# Patient Record
Sex: MALE | Race: BLACK OR AFRICAN AMERICAN
[De-identification: names, ages, dates, MRNs, and addresses within clinical notes are randomized per-mention and may not be internally consistent; named-entity substitution may affect disease eponyms.]

---

## 2018-01-21 ENCOUNTER — HOSPITAL ENCOUNTER (EMERGENCY)
Dept: HOSPITAL 96 - M.ERS | Age: 48
Discharge: HOME | End: 2018-01-21
Payer: MEDICAID

## 2018-01-21 VITALS — HEIGHT: 70 IN | WEIGHT: 175 LBS | BODY MASS INDEX: 25.05 KG/M2

## 2018-01-21 VITALS — DIASTOLIC BLOOD PRESSURE: 69 MMHG | SYSTOLIC BLOOD PRESSURE: 121 MMHG

## 2018-01-21 DIAGNOSIS — I11.0: ICD-10-CM

## 2018-01-21 DIAGNOSIS — Z88.1: ICD-10-CM

## 2018-01-21 DIAGNOSIS — Z76.0: Primary | ICD-10-CM

## 2018-02-15 ENCOUNTER — HOSPITAL ENCOUNTER (EMERGENCY)
Dept: HOSPITAL 96 - M.ERS | Age: 48
Discharge: LEFT BEFORE DISCHARGE | End: 2018-02-15
Payer: COMMERCIAL

## 2018-02-15 VITALS — HEIGHT: 70 IN | WEIGHT: 175 LBS | BODY MASS INDEX: 25.05 KG/M2

## 2018-02-15 VITALS — DIASTOLIC BLOOD PRESSURE: 83 MMHG | SYSTOLIC BLOOD PRESSURE: 118 MMHG

## 2018-02-15 DIAGNOSIS — Z53.21: Primary | ICD-10-CM

## 2018-04-10 ENCOUNTER — HOSPITAL ENCOUNTER (EMERGENCY)
Dept: HOSPITAL 96 - M.ERS | Age: 48
Discharge: HOME | End: 2018-04-10
Payer: COMMERCIAL

## 2018-04-10 VITALS — SYSTOLIC BLOOD PRESSURE: 116 MMHG | DIASTOLIC BLOOD PRESSURE: 87 MMHG

## 2018-04-10 VITALS — WEIGHT: 185.01 LBS | BODY MASS INDEX: 26.49 KG/M2 | HEIGHT: 70 IN

## 2018-04-10 DIAGNOSIS — Z88.1: ICD-10-CM

## 2018-04-10 DIAGNOSIS — I50.9: ICD-10-CM

## 2018-04-10 DIAGNOSIS — R07.81: Primary | ICD-10-CM

## 2018-04-10 DIAGNOSIS — Z76.0: ICD-10-CM

## 2018-04-10 DIAGNOSIS — F17.210: ICD-10-CM

## 2018-04-10 DIAGNOSIS — I11.0: ICD-10-CM

## 2018-04-10 LAB
ABSOLUTE BASOPHILS: 0.1 THOU/UL (ref 0–0.2)
ABSOLUTE EOSINOPHILS: 0.2 THOU/UL (ref 0–0.7)
ABSOLUTE MONOCYTES: 0.9 THOU/UL (ref 0–1.2)
ALBUMIN SERPL-MCNC: 3.2 G/DL (ref 3.4–5)
ALP SERPL-CCNC: 73 U/L (ref 46–116)
ALT SERPL-CCNC: 119 U/L (ref 30–65)
ANION GAP SERPL CALC-SCNC: 7 MMOL/L (ref 7–16)
AST SERPL-CCNC: 53 U/L (ref 15–37)
BASOPHILS NFR BLD AUTO: 0.9 %
BILIRUB SERPL-MCNC: 0.9 MG/DL
BUN SERPL-MCNC: 18 MG/DL (ref 7–18)
CALCIUM SERPL-MCNC: 8.5 MG/DL (ref 8.5–10.1)
CHLORIDE SERPL-SCNC: 101 MMOL/L (ref 98–107)
CO2 SERPL-SCNC: 31 MMOL/L (ref 21–32)
CREAT SERPL-MCNC: 2 MG/DL (ref 0.6–1.3)
EOSINOPHIL NFR BLD: 1.8 %
GLUCOSE SERPL-MCNC: 104 MG/DL (ref 70–99)
GRANULOCYTES NFR BLD MANUAL: 68.6 %
HCT VFR BLD CALC: 36.4 % (ref 42–52)
HGB BLD-MCNC: 11.4 GM/DL (ref 14–18)
LYMPHOCYTES # BLD: 1.8 THOU/UL (ref 0.8–5.3)
LYMPHOCYTES NFR BLD AUTO: 19.3 %
MCH RBC QN AUTO: 27.1 PG (ref 26–34)
MCHC RBC AUTO-ENTMCNC: 31.3 G/DL (ref 28–37)
MCV RBC: 86.7 FL (ref 80–100)
MONOCYTES NFR BLD: 9.4 %
MPV: 9.5 FL. (ref 7.2–11.1)
NEUTROPHILS # BLD: 6.3 THOU/UL (ref 1.6–8.1)
NT-PRO BRAIN NAT PEPTIDE: 4519 PG/ML (ref ?–300)
NUCLEATED RBCS: 0 /100WBC
PLATELET COUNT*: 208 THOU/UL (ref 150–400)
POTASSIUM SERPL-SCNC: 3.3 MMOL/L (ref 3.5–5.1)
PROT SERPL-MCNC: 7.4 G/DL (ref 6.4–8.2)
RBC # BLD AUTO: 4.19 MIL/UL (ref 4.5–6)
RDW-CV: 16.8 % (ref 10.5–14.5)
SODIUM SERPL-SCNC: 139 MMOL/L (ref 136–145)
WBC # BLD AUTO: 9.2 THOU/UL (ref 4–11)

## 2018-04-11 ENCOUNTER — HOSPITAL ENCOUNTER (EMERGENCY)
Dept: HOSPITAL 96 - M.ERS | Age: 48
Discharge: HOME | End: 2018-04-11
Payer: COMMERCIAL

## 2018-04-11 VITALS — DIASTOLIC BLOOD PRESSURE: 67 MMHG | SYSTOLIC BLOOD PRESSURE: 100 MMHG

## 2018-04-11 VITALS — WEIGHT: 207.68 LBS | HEIGHT: 70 IN | BODY MASS INDEX: 29.73 KG/M2

## 2018-04-11 DIAGNOSIS — I50.9: ICD-10-CM

## 2018-04-11 DIAGNOSIS — Z88.1: ICD-10-CM

## 2018-04-11 DIAGNOSIS — T50.995A: Primary | ICD-10-CM
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
## 2018-04-11 DIAGNOSIS — F17.210: ICD-10-CM

## 2018-04-11 DIAGNOSIS — I11.0: ICD-10-CM

## 2018-04-24 ENCOUNTER — HOSPITAL ENCOUNTER (INPATIENT)
Dept: HOSPITAL 96 - M.ERS | Age: 48
LOS: 1 days | Discharge: LEFT BEFORE DISCHARGE | DRG: 291 | End: 2018-04-25
Attending: INTERNAL MEDICINE | Admitting: INTERNAL MEDICINE
Payer: COMMERCIAL

## 2018-04-24 VITALS — BODY MASS INDEX: 30.31 KG/M2 | HEIGHT: 70 IN | WEIGHT: 211.7 LBS

## 2018-04-24 VITALS — DIASTOLIC BLOOD PRESSURE: 81 MMHG | SYSTOLIC BLOOD PRESSURE: 115 MMHG

## 2018-04-24 VITALS — SYSTOLIC BLOOD PRESSURE: 126 MMHG | DIASTOLIC BLOOD PRESSURE: 90 MMHG

## 2018-04-24 DIAGNOSIS — Z79.899: ICD-10-CM

## 2018-04-24 DIAGNOSIS — Z91.14: ICD-10-CM

## 2018-04-24 DIAGNOSIS — I13.0: Primary | ICD-10-CM

## 2018-04-24 DIAGNOSIS — I42.9: ICD-10-CM

## 2018-04-24 DIAGNOSIS — N39.0: ICD-10-CM

## 2018-04-24 DIAGNOSIS — Z95.810: ICD-10-CM

## 2018-04-24 DIAGNOSIS — Z88.1: ICD-10-CM

## 2018-04-24 DIAGNOSIS — Z88.8: ICD-10-CM

## 2018-04-24 DIAGNOSIS — I50.23: ICD-10-CM

## 2018-04-24 DIAGNOSIS — R33.9: ICD-10-CM

## 2018-04-24 DIAGNOSIS — F17.210: ICD-10-CM

## 2018-04-24 LAB
ABSOLUTE BASOPHILS: 0.1 THOU/UL (ref 0–0.2)
ABSOLUTE EOSINOPHILS: 0.1 THOU/UL (ref 0–0.7)
ABSOLUTE MONOCYTES: 0.8 THOU/UL (ref 0–1.2)
ALBUMIN SERPL-MCNC: 2.8 G/DL (ref 3.4–5)
ALP SERPL-CCNC: 82 U/L (ref 46–116)
ALT SERPL-CCNC: 36 U/L (ref 30–65)
ANION GAP SERPL CALC-SCNC: 6 MMOL/L (ref 7–16)
APTT BLD: 26.3 SECONDS (ref 25–31.3)
AST SERPL-CCNC: 22 U/L (ref 15–37)
BACTERIA-REFLEX: (no result) /HPF
BASOPHILS NFR BLD AUTO: 1.4 %
BILIRUB SERPL-MCNC: 1.1 MG/DL
BILIRUB UR-MCNC: NEGATIVE MG/DL
BUN SERPL-MCNC: 14 MG/DL (ref 7–18)
CALCIUM SERPL-MCNC: 8.2 MG/DL (ref 8.5–10.1)
CHLORIDE SERPL-SCNC: 103 MMOL/L (ref 98–107)
CO2 SERPL-SCNC: 32 MMOL/L (ref 21–32)
COLOR UR: YELLOW
CREAT SERPL-MCNC: 1.6 MG/DL (ref 0.6–1.3)
EOSINOPHIL NFR BLD: 1.7 %
GLUCOSE SERPL-MCNC: 135 MG/DL (ref 70–99)
GRANULOCYTES NFR BLD MANUAL: 66.9 %
HCT VFR BLD CALC: 33.7 % (ref 42–52)
HGB BLD-MCNC: 10.5 GM/DL (ref 14–18)
HYALINE CASTS #/AREA URNS LPF: (no result) /LPF
INR PPP: 1.5
KETONES UR STRIP-MCNC: NEGATIVE MG/DL
LYMPHOCYTES # BLD: 1.5 THOU/UL (ref 0.8–5.3)
LYMPHOCYTES NFR BLD AUTO: 19.9 %
MCH RBC QN AUTO: 26 PG (ref 26–34)
MCHC RBC AUTO-ENTMCNC: 31.1 G/DL (ref 28–37)
MCV RBC: 83.7 FL (ref 80–100)
MONOCYTES NFR BLD: 10.1 %
MPV: 9.6 FL. (ref 7.2–11.1)
NEUTROPHILS # BLD: 5.1 THOU/UL (ref 1.6–8.1)
NT-PRO BRAIN NAT PEPTIDE: 2990 PG/ML (ref ?–300)
NUCLEATED RBCS: 0 /100WBC
PLATELET COUNT*: 177 THOU/UL (ref 150–400)
POTASSIUM SERPL-SCNC: 3.5 MMOL/L (ref 3.5–5.1)
PROT SERPL-MCNC: 7.2 G/DL (ref 6.4–8.2)
PROT UR QL STRIP: (no result)
PROTHROMBIN TIME: 14.3 SECONDS (ref 9.2–11.5)
RBC # BLD AUTO: 4.03 MIL/UL (ref 4.5–6)
RBC # UR STRIP: (no result) /UL
RBC #/AREA URNS HPF: (no result) /HPF (ref 0–2)
RDW-CV: 18.1 % (ref 10.5–14.5)
SODIUM SERPL-SCNC: 141 MMOL/L (ref 136–145)
SP GR UR STRIP: 1.01 (ref 1–1.03)
SQUAMOUS: (no result) /LPF (ref 0–3)
TROPONIN-I LEVEL: <0.06 NG/ML (ref ?–0.06)
URINE CLARITY: CLEAR
URINE GLUCOSE-RANDOM: NEGATIVE
URINE LEUKOCYTES-REFLEX: (no result)
URINE NITRITE-REFLEX: NEGATIVE
URINE WBC-REFLEX: (no result) /HPF (ref 0–5)
UROBILINOGEN UR STRIP-ACNC: 2 E.U./DL (ref 0.2–1)
WBC # BLD AUTO: 7.6 THOU/UL (ref 4–11)

## 2018-04-25 VITALS — SYSTOLIC BLOOD PRESSURE: 118 MMHG | DIASTOLIC BLOOD PRESSURE: 88 MMHG

## 2018-04-25 VITALS — SYSTOLIC BLOOD PRESSURE: 93 MMHG | DIASTOLIC BLOOD PRESSURE: 69 MMHG

## 2018-04-25 VITALS — SYSTOLIC BLOOD PRESSURE: 117 MMHG | DIASTOLIC BLOOD PRESSURE: 87 MMHG

## 2018-04-25 VITALS — SYSTOLIC BLOOD PRESSURE: 109 MMHG | DIASTOLIC BLOOD PRESSURE: 83 MMHG

## 2018-04-25 LAB
ANION GAP SERPL CALC-SCNC: 7 MMOL/L (ref 7–16)
BUN SERPL-MCNC: 16 MG/DL (ref 7–18)
CALCIUM SERPL-MCNC: 8.5 MG/DL (ref 8.5–10.1)
CHLORIDE SERPL-SCNC: 101 MMOL/L (ref 98–107)
CO2 SERPL-SCNC: 30 MMOL/L (ref 21–32)
CREAT SERPL-MCNC: 1.4 MG/DL (ref 0.6–1.3)
GLUCOSE SERPL-MCNC: 150 MG/DL (ref 70–99)
MAGNESIUM SERPL-MCNC: 1.8 MG/DL (ref 1.8–2.4)
POTASSIUM SERPL-SCNC: 4.1 MMOL/L (ref 3.5–5.1)
SODIUM SERPL-SCNC: 138 MMOL/L (ref 136–145)

## 2018-04-25 NOTE — 2DMMODE
[place] [address]
Phone:  [phone] 2 D/M-MODE ECHOCARDIOGRAM     
_______________________________________________________________________________
 
Name:         ISIDRA MCDONNELL              Room:          [address] [place] 
[facility]#:    [id number]     Account #:     [id number]  
Admission:    18    Attend Phys:   Omari Wesley, 
Discharge:                Date of Birth: [birth year]  
Date of Service: 18 1520  Report #:      [id number]
        [id number]
_______________________________________________________________________________
THIS REPORT FOR:  //name//                      
 
 
--------------- APPROVED REPORT --------------
 
 
Study performed:  2018 14:01:01
 
EXAM: Comprehensive 2D, Doppler, and color-flow 
Echocardiogram 
Patient Location: In-Patient   
Room #:  [facility]     Status:  routine
 
      BSA:         2.14
HR: 96 bpm BP:          117/87 mmHg 
Rhythm: NSR     
 
Other Information 
Study Quality: Good
 
Indications
Congestive Heart Failure
Dyspnea 
 
2D Dimensions
   LVEF(%):  8.76 (&gt;50%)
IVSd:  9.79 (7-11mm) LVOT Diam:  19.59 (18-24mm) 
LVDd:  60.78 mm  
PWd:  9.93 (7-11mm) Ascending Ao:  29.19 (22-36mm)
LVDs:  58.39 (25-40mm) 
Aortic Root:  31.54 mm 
   Francois's LVEF:  8.76 %
 
Volumes
Left Atrial Volume (Systole) 
    LA ESV Index:  65.70 mL/m2
 
Aortic Valve
AoV Peak Vic.:  0.60 m/s 
AO Peak Gr.:  1.46 mmHg  LVOT Max P.42 mmHg
AO Mean Gr.:  0.96 mmHg  LVOT Mean P.31 mmHg
    LVOT Max V:  0.78 m/s
AO V2 VTI:  6.32 cm  LVOT Mean V:  0.54 m/s
TAE (VTI):  4.77 cm2  LVOT V1 VTI:  10.00 cm
 
Mitral Valve
 
 
[place] [address]
Phone:  [phone]                     2 D/M-MODE ECHOCARDIOGRAM     
_______________________________________________________________________________
 
Name:         ISIDRA MCDONNELL              Room:          [address] [place] 
.R.#:    [id number]     Account #:     [id number]  
Admission:    18    Attend Phys:   Omari Wesley, 
Discharge:                Date of Birth: [birth year]  
Date of Service: 18 1520  Report #:      [id number]
        [id number]
_______________________________________________________________________________
    E/A Ratio:  2.57
    MV Decel. Time:  88.13 ms
MV E Max Vic.:  0.79 m/s 
MV PHT:  25.56 ms  
MVA (PHT):  8.61 cm2  
 
TDI
E/Lateral E':  7.18 
   Lateral E' Vic.:  0.11 m/s
 
Pulmonary Valve
PV Peak Vic.:  0.56 m/s PV Peak Gr.:  1.26 mmHg
 
Tricuspid Valve
TR Peak Gr.:  17.38 mmHg RVSP:  32.00 mmHg
 
Left Ventricle
Left ventricle is moderately dilated. Severe global hypokinesis. 
There is normal left ventricular wall thickness. Left ventricular 
systolic function is severely decreased. LVEF is 20%. Grade IV - 
fixed restrictive diastolic dysfunction.
 
Right Ventricle
Right ventricle is mildly dilated. The right ventricular systolic 
function is normal. Pacemaker lead is present in the right ventricle. 
 
Atria
Left atrium is moderately dilated. Right atrium is dilated.
 
Aortic Valve
Mild aortic valve sclerosis. Trace aortic regurgitation. There is no 
aortic valvular stenosis.
 
Mitral Valve
The mitral valve is normal in structure. Mild mitral regurgitation. 
No evidence of mitral valve stenosis.
 
Tricuspid Valve
The tricuspid valve is normal in structure. Moderate tricuspid 
regurgitation. The RVSP is 30-35 mmHg.
 
Pulmonic Valve
The pulmonary valve is normal in structure. Mild pulmonic 
regurgitation.
 
[employer]
 
 
[place] [address]
Phone:  [phone] 2 D/M-MODE ECHOCARDIOGRAM     
_______________________________________________________________________________
 
Name:         ISIDRA MCDONNELL              Room:          [address]#:    [id number]     Account #:     [id number]  
Admission:    18    Attend Phys:   Omari Wesley, 
Discharge:                Date of Birth: [birth year]  
Date of Service: 18 1520  Report #:      [id number]
        [id number]
_______________________________________________________________________________
The aortic root is normal in size. IVC is dilated and collapses 
&lt;50% with inspiration.
 
Pericardium
There is no pericardial effusion.
 
&lt;Conclusion&gt;
Left ventricle is moderately dilated.
There is normal left ventricular wall thickness.
Left ventricular systolic function is severely decreased.
LVEF is 20%.
Grade IV - fixed restrictive diastolic dysfunction.
Right ventricle is mildly dilated.
Left atrium is moderately dilated.
Right atrium is dilated.
Mild aortic valve sclerosis.
Trace aortic regurgitation.
There is no aortic valvular stenosis.
The mitral valve is normal in structure.
Mild mitral regurgitation.
The tricuspid valve is normal in structure.
Moderate tricuspid regurgitation.
The RVSP is 30-35 mmHg.
IVC is dilated and collapses &lt;50% with inspiration.
There is no pericardial effusion.
Severe global hypokinesis.
Pacemaker lead is present in the right ventricle.
 
 
 
 
 
 
 
 
 
 
 
 
 
 
 
 
 
  <ELECTRONICALLY SIGNED>
                                           By: Mario Bermudez MD, FACC     
  18     1520
D: 18 1520   _____________________________________
T: 18 1520   Mario Bermudez MD, FACC       /INF

## 2018-04-25 NOTE — EKG
[place] [address]
Phone:  [phone]                     ELECTROCARDIOGRAM REPORT      
_______________________________________________________________________________
 
Name:       ISIDRA MCDONNELL               Room:           [address]    ADM [place]  
M.R.#:  [id number]      Account #:      [id number]  
Admission:  18     Attend Phys:    Omari Wesley MD 
Discharge:               Date of Birth:  [birth year]  
         Report #: [id number]
    [id number]
_______________________________________________________________________________
THIS REPORT FOR:  //name//                      
 
                         [facility] ED
                                       
Test Date:    2018               Test Time:    22:00:50
Pat Name:     ISIDRA MCDONNELL           Department:   
Patient ID:   [id number]            Room:         [facility]
Gender:       M                        Technician:   BD
:          1970               Requested By: Jennifer Falk
Order Number: [id number] MD:   Mario Bermudez
                                 Measurements
Intervals                              Axis          
Rate:         97                       P:            65
TN:           158                      QRS:          1
QRSD:         89                       T:            
QT:           385                                    
QTc:          489                                    
                           Interpretive Statements
Sinus rhythm
Probable left atrial enlargement
Nonspecific T abnormalities, lateral leads
Borderline prolonged QT interval
Baseline wander in lead(s) V5
Compared to ECG 2017 01:02:54
T-wave abnormality now present
 
Electronically Signed On 2018 15:08:00 CDT by Mario Bermudez
[email or web address]
 
 
 
 
 
 
 
 
 
 
 
 
 
 
 
  <ELECTRONICALLY SIGNED>
                                           By: Mario Bermudez MD, [facility]     
  18     1508
D: 18   _____________________________________
T: 18   Mario Bermudez MD, [facility]       /EPI

## 2018-04-28 ENCOUNTER — HOSPITAL ENCOUNTER (EMERGENCY)
Dept: HOSPITAL 96 - M.ERS | Age: 48
Discharge: HOME | End: 2018-04-28
Payer: COMMERCIAL

## 2018-04-28 VITALS — BODY MASS INDEX: 30.49 KG/M2 | HEIGHT: 70 IN | WEIGHT: 213.01 LBS

## 2018-04-28 VITALS — DIASTOLIC BLOOD PRESSURE: 79 MMHG | SYSTOLIC BLOOD PRESSURE: 112 MMHG

## 2018-04-28 DIAGNOSIS — Z88.1: ICD-10-CM

## 2018-04-28 DIAGNOSIS — Z88.8: ICD-10-CM

## 2018-04-28 DIAGNOSIS — I50.9: ICD-10-CM

## 2018-04-28 DIAGNOSIS — I13.0: Primary | ICD-10-CM

## 2018-04-28 DIAGNOSIS — N18.3: ICD-10-CM

## 2018-04-28 DIAGNOSIS — F17.210: ICD-10-CM

## 2018-04-28 LAB
ABSOLUTE BASOPHILS: 0.1 THOU/UL (ref 0–0.2)
ABSOLUTE EOSINOPHILS: 0.1 THOU/UL (ref 0–0.7)
ABSOLUTE MONOCYTES: 0.6 THOU/UL (ref 0–1.2)
ALBUMIN SERPL-MCNC: 2.8 G/DL (ref 3.4–5)
ALP SERPL-CCNC: 90 U/L (ref 46–116)
ALT SERPL-CCNC: 52 U/L (ref 30–65)
ANION GAP SERPL CALC-SCNC: 9 MMOL/L (ref 7–16)
APTT BLD: 25.8 SECONDS (ref 25–31.3)
AST SERPL-CCNC: 44 U/L (ref 15–37)
BASOPHILS NFR BLD AUTO: 1.1 %
BILIRUB SERPL-MCNC: 1.1 MG/DL
BUN SERPL-MCNC: 15 MG/DL (ref 7–18)
CALCIUM SERPL-MCNC: 8.3 MG/DL (ref 8.5–10.1)
CHLORIDE SERPL-SCNC: 100 MMOL/L (ref 98–107)
CK-MB MASS: 1.5 NG/ML
CO2 SERPL-SCNC: 28 MMOL/L (ref 21–32)
CREAT SERPL-MCNC: 1.4 MG/DL (ref 0.6–1.3)
EOSINOPHIL NFR BLD: 1.8 %
GLUCOSE SERPL-MCNC: 129 MG/DL (ref 70–99)
GRANULOCYTES NFR BLD MANUAL: 69.1 %
HCT VFR BLD CALC: 35.2 % (ref 42–52)
HGB BLD-MCNC: 10.9 GM/DL (ref 14–18)
INR PPP: 1.5
LIPASE: 305 U/L (ref 73–393)
LYMPHOCYTES # BLD: 1.3 THOU/UL (ref 0.8–5.3)
LYMPHOCYTES NFR BLD AUTO: 19.3 %
MAGNESIUM SERPL-MCNC: 1.6 MG/DL (ref 1.8–2.4)
MCH RBC QN AUTO: 25.9 PG (ref 26–34)
MCHC RBC AUTO-ENTMCNC: 30.9 G/DL (ref 28–37)
MCV RBC: 83.8 FL (ref 80–100)
MONOCYTES NFR BLD: 8.7 %
MPV: 10.3 FL. (ref 7.2–11.1)
NEUTROPHILS # BLD: 4.7 THOU/UL (ref 1.6–8.1)
NT-PRO BRAIN NAT PEPTIDE: 2411 PG/ML (ref ?–300)
NUCLEATED RBCS: 0 /100WBC
PLATELET COUNT*: 170 THOU/UL (ref 150–400)
POTASSIUM SERPL-SCNC: 3.3 MMOL/L (ref 3.5–5.1)
PROT SERPL-MCNC: 7.5 G/DL (ref 6.4–8.2)
PROTHROMBIN TIME: 14.7 SECONDS (ref 9.2–11.5)
RBC # BLD AUTO: 4.2 MIL/UL (ref 4.5–6)
RDW-CV: 18.2 % (ref 10.5–14.5)
SODIUM SERPL-SCNC: 137 MMOL/L (ref 136–145)
TROPONIN-I LEVEL: <0.06 NG/ML (ref ?–0.06)
WBC # BLD AUTO: 6.7 THOU/UL (ref 4–11)

## 2018-04-29 NOTE — EKG
[place] [address]
Phone:  [phone]                     ELECTROCARDIOGRAM REPORT      
_______________________________________________________________________________
 
Name:       ISIDRA MCDONNELL               Room:                      [facility]#:  [id number]      Account #:      [id number]  
Admission:  18     Attend Phys:                         
Discharge:  18     Date of Birth:  [birth year]  
         Report #: [id number]
    [id number]
_______________________________________________________________________________
THIS REPORT FOR:  //name//                      
 
                         [facility] ED
                                       
Test Date:    2018               Test Time:    22:40:44
Pat Name:     ISIDRA MCDONNELL           Department:   
Patient ID:   [id number]            Room:          
Gender:       M                        Technician:   RICO GUILLEN
:          1970               Requested By: Sheldon Aldana
Order Number: [id number] MD:   Nam Ramirez
                                 Measurements
Intervals                              Axis          
Rate:         101                      P:            61
TX:           158                      QRS:          -1
QRSD:         86                       T:            
QT:           382                                    
QTc:          496                                    
                           Interpretive Statements
Sinus tachycardia
Left atrial enlargement
Nonspecific T abnormalities, lateral leads
Borderline prolonged QT interval
Baseline wander in lead(s) II,III,aVF
Compared to ECG 2018 22:00:50
Sinus rhythm no longer present
T-wave abnormality still present
 
Electronically Signed On 2018 13:27:16 CDT by Nam Ramirez
[email or web address]
 
 
 
 
 
 
 
 
 
 
 
 
 
 
  <ELECTRONICALLY SIGNED>
                                           By: Nam Ramirez MD, FACC   
  18     1327
D: 18 2240   _____________________________________
T: 18 2240   Nam Ramirez MD, FAC     /EPI

## 2018-07-22 ENCOUNTER — HOSPITAL ENCOUNTER (INPATIENT)
Dept: HOSPITAL 96 - M.ERS | Age: 48
LOS: 1 days | Discharge: LEFT BEFORE DISCHARGE | DRG: 313 | End: 2018-07-23
Attending: INTERNAL MEDICINE | Admitting: INTERNAL MEDICINE
Payer: MEDICAID

## 2018-07-22 VITALS — DIASTOLIC BLOOD PRESSURE: 72 MMHG | SYSTOLIC BLOOD PRESSURE: 101 MMHG

## 2018-07-22 VITALS — SYSTOLIC BLOOD PRESSURE: 107 MMHG | DIASTOLIC BLOOD PRESSURE: 71 MMHG

## 2018-07-22 VITALS — HEIGHT: 67.01 IN | BODY MASS INDEX: 31.39 KG/M2 | WEIGHT: 200 LBS

## 2018-07-22 VITALS — SYSTOLIC BLOOD PRESSURE: 128 MMHG | DIASTOLIC BLOOD PRESSURE: 51 MMHG

## 2018-07-22 DIAGNOSIS — Z88.8: ICD-10-CM

## 2018-07-22 DIAGNOSIS — R07.9: Primary | ICD-10-CM

## 2018-07-22 DIAGNOSIS — Z95.810: ICD-10-CM

## 2018-07-22 DIAGNOSIS — Z88.1: ICD-10-CM

## 2018-07-22 DIAGNOSIS — I13.0: ICD-10-CM

## 2018-07-22 DIAGNOSIS — I50.20: ICD-10-CM

## 2018-07-22 DIAGNOSIS — N18.3: ICD-10-CM

## 2018-07-22 DIAGNOSIS — F17.210: ICD-10-CM

## 2018-07-22 LAB
%HYPO/RBC NFR BLD AUTO: (no result) %
ABSOLUTE MONOCYTES: 0.5 THOU/UL (ref 0–1.2)
ALBUMIN SERPL-MCNC: 2.9 G/DL (ref 3.4–5)
ALP SERPL-CCNC: 112 U/L (ref 46–116)
ALT SERPL-CCNC: 26 U/L (ref 30–65)
ANION GAP SERPL CALC-SCNC: 8 MMOL/L (ref 7–16)
ANISOCYTOSIS BLD QL SMEAR: (no result)
APTT BLD: 28 SECONDS (ref 25–31.3)
AST SERPL-CCNC: 37 U/L (ref 15–37)
BILIRUB SERPL-MCNC: 3.8 MG/DL
BUN SERPL-MCNC: 47 MG/DL (ref 7–18)
CALCIUM SERPL-MCNC: 9.1 MG/DL (ref 8.5–10.1)
CHLORIDE SERPL-SCNC: 96 MMOL/L (ref 98–107)
CO2 SERPL-SCNC: 28 MMOL/L (ref 21–32)
CREAT SERPL-MCNC: 1.6 MG/DL (ref 0.6–1.3)
GLUCOSE SERPL-MCNC: 121 MG/DL (ref 70–99)
GRANULOCYTES NFR BLD MANUAL: 90 %
HCT VFR BLD CALC: 34.2 % (ref 42–52)
HGB BLD-MCNC: 10.5 GM/DL (ref 14–18)
INR PPP: 2
LYMPHOCYTES # BLD: 0.2 THOU/UL (ref 0.8–5.3)
LYMPHOCYTES NFR BLD AUTO: 3 %
MCH RBC QN AUTO: 25.7 PG (ref 26–34)
MCHC RBC AUTO-ENTMCNC: 30.6 G/DL (ref 28–37)
MCV RBC: 83.8 FL (ref 80–100)
MONOCYTES NFR BLD: 7 %
MPV: 10 FL. (ref 7.2–11.1)
NEUTROPHILS # BLD: 7 THOU/UL (ref 1.6–8.1)
NT-PRO BRAIN NAT PEPTIDE: 6805 PG/ML (ref ?–300)
NUCLEATED RBCS: 1 /100WBC
PLATELET COUNT*: 181 THOU/UL (ref 150–400)
POIKILOCYTOSIS BLD QL SMEAR: (no result)
POTASSIUM SERPL-SCNC: 3.8 MMOL/L (ref 3.5–5.1)
PROT SERPL-MCNC: 8.4 G/DL (ref 6.4–8.2)
PROTHROMBIN TIME: 19.2 SECONDS (ref 9.2–11.5)
RBC # BLD AUTO: 4.08 MIL/UL (ref 4.5–6)
RDW-CV: 25.6 % (ref 10.5–14.5)
SCHISTOCYTES BLD QL SMEAR: (no result)
SODIUM SERPL-SCNC: 132 MMOL/L (ref 136–145)
TARGETS BLD QL SMEAR: (no result)
TROPONIN-I LEVEL: <0.06 NG/ML (ref ?–0.06)
WBC # BLD AUTO: 7.8 THOU/UL (ref 4–11)

## 2018-07-22 NOTE — H
[address]  [address]                    HISTORY AND PHYSICAL          
_______________________________________________________________________________
 
Name:       ISIDRA MCDONNELL               Room:           [address] [place]  
.R.#:  [id number]      Account #:      [id number]  
Admission:  07/22/18     Attend Phys:    Yolie Younger MD 
Discharge:  07/23/18     Date of Birth:  [birth year]  
         Report #: [id number]
                                                                                
_______________________________________________________________________________
THIS REPORT FOR:  //name//                      
 
Patient was here less than 24 hour please refer to the final summation note.
Patient left AMA.
 
 
 
 
 
 
 
 
 
 
 
 
 
 
 
 
 
 
 
 
 
 
 
 
 
 
 
 
 
 
 
 
 
 
 
 
 
 
 
 
                       
                                        By:                                
                 
D: 07/22/18     _______________________________________
T: 07/24/18 1036Medical Records Staff MANUELA       /AL

## 2018-07-23 VITALS — SYSTOLIC BLOOD PRESSURE: 96 MMHG | DIASTOLIC BLOOD PRESSURE: 70 MMHG

## 2018-07-23 NOTE — NUR
PT REMOVED HIS CARDIAC MONITORING AND STATED THAT HE WANTED TO LEAVE AGAINST
MEDICAL ADVICE. RISK DISCUSSED WITH PT, VERBALIZED UNDERSTANDING. AMA
PAPERWORK FILLED OUT AND GIVEN TO PT TO SIGN. PT STATED "I UNDERSTAND WHAT IT
IS FOR, I DON'T WANT TO SIGN AND I'M NOT GOING TO". IV DISCONTINUED.  AND PT
GOT HIMSELF DRESSED. PT REQUESTED HIS CIGARETTES AND LIGHTER, SECURITY WAS
CALLED, PER SECURITY NO BELONGINGS WERE CHECKED IN. SECURITY ARRIVED TO SPEAK
WITH PATIENT ABOUT MISSING BELONGINGS. PT NOT IN ROOM, ALL BELONGINGS
(CLOTHES, SHOES, CELL PHONE, ) WERE NOT IN THE ROOM.

## 2018-07-23 NOTE — NUR
RECEIVED REPORT AND ASSUMED CARE AT 2210. PT TRANSFERED FROM ED TO ROOM 208.
CARDIAC MONITORING IN PLACE. VSS. PT DENIES ANY COMPLAINTS OF PAIN. PT VERY
DROWSY DURING ADMISSION. ASSESSMENT COMPLETED AS CHARTED. ADMISSION COMPLETED
BY NURSING. DISCUSSED PLAN OF CARE WITH PT, VERBALIZED UNDERSTANDING.
CARDIOLOGY CONSULTED. PT VERY FORGETFUL AND DROWSY, NEEDING FREQUENT
REDIRECTION. PT REMOVED CARDIAC MONITORING MULTIPLE TIMES DURING THE NIGHT,
WAS EDUCATED ON IMPORTANCE OF LEAVING IN PLACE. PT UP WITH ASSIST, ON 2L NC.
PT REMOVED NC MULTIPLE TIMES DURING THE SHIFT. NURSING APPLIED NC BACK ONTO
PT. BED LOCKED IN LOWEST POSITION, CALL LIGHT WITHIN REACH, BED ALARM ON,
HOURLY ROUNDING COMPLETED AND ALL NEEDS MET. WILL CONTINUE TO MONITOR

## 2018-07-23 NOTE — EKG
[place] [address]
Phone:  [phone]                     ELECTROCARDIOGRAM REPORT      
_______________________________________________________________________________
 
Name:       ISIDRA MCDONNELL               Room:           [address] [place]  
.R#:  [id number]      Account #:      [id number]  
Admission:  18     Attend Phys:    Yolie Younger MD 
Discharge:  18     Date of Birth:  [birth year]  
         Report #: [id number]
    [id number]
_______________________________________________________________________________
THIS REPORT FOR:  //name//                      
 
                         [facility] ED
                                       
Test Date:    2018               Test Time:    18:29:32
Pat Name:     ISIRDA MCDONNELL           Department:   
Patient ID:   [id number]            Room:          
Gender:                               Technician:   MINDY OLSON
:          1970               Requested By: Jennifer Falk
Order Number: [id number] MD:   Nam Ramirez
                                 Measurements
Intervals                              Axis          
Rate:         104                      P:            43
ND:           128                      QRS:          -7
QRSD:         92                       T:            32
QT:           366                                    
QTc:          482                                    
                           Interpretive Statements
Sinus tachycardia
Ventricular bigeminy
Low voltage, precordial leads
Borderline T wave abnormalities
Compared to ECG 2018 22:40:44
Ventricular premature complex(es) now present
Low QRS voltage now present
Atrial abnormality no longer present
T-wave abnormality still present
 
Electronically Signed On 2018 19:32:05 CDT by Nam Ramirez
[email or web address]
 
 
 
 
 
 
 
 
 
 
 
 
 
  <ELECTRONICALLY SIGNED>
                                           By: Nam Ramirez MD, FACC   
  18     193
D: 18   _____________________________________
T: 18   Nam Ramirez MD, [facility]     /EPI

## 2018-07-25 ENCOUNTER — HOSPITAL ENCOUNTER (EMERGENCY)
Dept: HOSPITAL 96 - M.ERS | Age: 48
Discharge: HOME | End: 2018-07-25
Payer: MEDICAID

## 2018-07-25 VITALS — SYSTOLIC BLOOD PRESSURE: 104 MMHG | DIASTOLIC BLOOD PRESSURE: 80 MMHG

## 2018-07-25 VITALS — HEIGHT: 67 IN | WEIGHT: 200 LBS | BODY MASS INDEX: 31.39 KG/M2

## 2018-07-25 DIAGNOSIS — Z88.8: ICD-10-CM

## 2018-07-25 DIAGNOSIS — F17.210: ICD-10-CM

## 2018-07-25 DIAGNOSIS — Z88.1: ICD-10-CM

## 2018-07-25 DIAGNOSIS — N50.89: Primary | ICD-10-CM

## 2018-07-25 DIAGNOSIS — I13.0: ICD-10-CM

## 2018-07-25 DIAGNOSIS — N18.3: ICD-10-CM

## 2018-07-25 DIAGNOSIS — I50.20: ICD-10-CM

## 2018-08-17 ENCOUNTER — HOSPITAL ENCOUNTER (INPATIENT)
Dept: HOSPITAL 96 - M.ERS | Age: 48
LOS: 3 days | Discharge: LEFT BEFORE DISCHARGE | DRG: 291 | End: 2018-08-20
Attending: INTERNAL MEDICINE | Admitting: INTERNAL MEDICINE
Payer: MEDICAID

## 2018-08-17 VITALS — WEIGHT: 210.76 LBS | HEIGHT: 67.99 IN | BODY MASS INDEX: 31.94 KG/M2

## 2018-08-17 VITALS — SYSTOLIC BLOOD PRESSURE: 110 MMHG | DIASTOLIC BLOOD PRESSURE: 65 MMHG

## 2018-08-17 VITALS — DIASTOLIC BLOOD PRESSURE: 106 MMHG | SYSTOLIC BLOOD PRESSURE: 170 MMHG

## 2018-08-17 VITALS — SYSTOLIC BLOOD PRESSURE: 130 MMHG | DIASTOLIC BLOOD PRESSURE: 111 MMHG

## 2018-08-17 DIAGNOSIS — K74.60: ICD-10-CM

## 2018-08-17 DIAGNOSIS — Z53.21: ICD-10-CM

## 2018-08-17 DIAGNOSIS — N18.4: ICD-10-CM

## 2018-08-17 DIAGNOSIS — F17.210: ICD-10-CM

## 2018-08-17 DIAGNOSIS — Z79.899: ICD-10-CM

## 2018-08-17 DIAGNOSIS — E44.0: ICD-10-CM

## 2018-08-17 DIAGNOSIS — K75.81: ICD-10-CM

## 2018-08-17 DIAGNOSIS — Z88.1: ICD-10-CM

## 2018-08-17 DIAGNOSIS — N34.2: ICD-10-CM

## 2018-08-17 DIAGNOSIS — I50.43: ICD-10-CM

## 2018-08-17 DIAGNOSIS — N17.0: ICD-10-CM

## 2018-08-17 DIAGNOSIS — Z91.14: ICD-10-CM

## 2018-08-17 DIAGNOSIS — I25.5: ICD-10-CM

## 2018-08-17 DIAGNOSIS — Z95.810: ICD-10-CM

## 2018-08-17 DIAGNOSIS — Z88.8: ICD-10-CM

## 2018-08-17 DIAGNOSIS — I25.10: ICD-10-CM

## 2018-08-17 DIAGNOSIS — Z82.49: ICD-10-CM

## 2018-08-17 DIAGNOSIS — I13.0: Primary | ICD-10-CM

## 2018-08-17 LAB
ABSOLUTE MONOCYTES: 0.3 THOU/UL (ref 0–1.2)
ALBUMIN SERPL-MCNC: 2.9 G/DL (ref 3.4–5)
ALP SERPL-CCNC: 127 U/L (ref 46–116)
ALT SERPL-CCNC: 32 U/L (ref 30–65)
ANION GAP SERPL CALC-SCNC: 13 MMOL/L (ref 7–16)
ANISOCYTOSIS BLD QL SMEAR: (no result)
AST SERPL-CCNC: 47 U/L (ref 15–37)
BACTERIA-REFLEX: (no result) /HPF
BILIRUB SERPL-MCNC: 4.3 MG/DL
BILIRUB UR-MCNC: (no result) MG/DL
BUN SERPL-MCNC: 47 MG/DL (ref 7–18)
CALCIUM SERPL-MCNC: 9.4 MG/DL (ref 8.5–10.1)
CHLORIDE SERPL-SCNC: 93 MMOL/L (ref 98–107)
CO2 SERPL-SCNC: 25 MMOL/L (ref 21–32)
COLOR UR: (no result)
CREAT SERPL-MCNC: 2.7 MG/DL (ref 0.6–1.3)
GLUCOSE SERPL-MCNC: 129 MG/DL (ref 70–99)
GRANULOCYTES NFR BLD MANUAL: 90 %
HCT VFR BLD CALC: 38.8 % (ref 42–52)
HGB BLD-MCNC: 11.9 GM/DL (ref 14–18)
HYALINE CASTS #/AREA URNS LPF: (no result) /LPF
KETONES UR STRIP-MCNC: (no result) MG/DL
LYMPHOCYTES # BLD: 0.3 THOU/UL (ref 0.8–5.3)
LYMPHOCYTES NFR BLD AUTO: 5 %
MCH RBC QN AUTO: 26.2 PG (ref 26–34)
MCHC RBC AUTO-ENTMCNC: 30.8 G/DL (ref 28–37)
MCV RBC: 85.3 FL (ref 80–100)
MONOCYTES NFR BLD: 5 %
MPV: 9 FL. (ref 7.2–11.1)
NEUTROPHILS # BLD: 5.8 THOU/UL (ref 1.6–8.1)
NUCLEATED RBCS: 0 /100WBC
PLATELET # BLD EST: ADEQUATE 10*3/UL
PLATELET COUNT*: 167 THOU/UL (ref 150–400)
POTASSIUM SERPL-SCNC: 4.6 MMOL/L (ref 3.5–5.1)
PROT SERPL-MCNC: 8.3 G/DL (ref 6.4–8.2)
PROT UR QL STRIP: (no result)
RBC # BLD AUTO: 4.55 MIL/UL (ref 4.5–6)
RBC # UR STRIP: (no result) /UL
RDW-CV: 23.3 % (ref 10.5–14.5)
SODIUM SERPL-SCNC: 131 MMOL/L (ref 136–145)
SP GR UR STRIP: >= 1.03 (ref 1–1.03)
SQUAMOUS: (no result) /LPF (ref 0–3)
URINE CHLORIDE-RANDOM*: 50 MMOL/L
URINE CLARITY: (no result)
URINE GLUCOSE-RANDOM: NEGATIVE
URINE LEUKOCYTES-REFLEX: (no result)
URINE NITRITE-REFLEX: NEGATIVE
URINE POTASSIUM-RANDOM: 61.8 MMOL/L
URINE WBC-REFLEX: (no result) /HPF (ref 0–5)
UROBILINOGEN UR STRIP-ACNC: 1 E.U./DL (ref 0.2–1)
WBC # BLD AUTO: 6.4 THOU/UL (ref 4–11)

## 2018-08-18 VITALS — DIASTOLIC BLOOD PRESSURE: 54 MMHG | SYSTOLIC BLOOD PRESSURE: 121 MMHG

## 2018-08-18 VITALS — DIASTOLIC BLOOD PRESSURE: 70 MMHG | SYSTOLIC BLOOD PRESSURE: 114 MMHG

## 2018-08-18 VITALS — SYSTOLIC BLOOD PRESSURE: 129 MMHG | DIASTOLIC BLOOD PRESSURE: 94 MMHG

## 2018-08-18 VITALS — SYSTOLIC BLOOD PRESSURE: 112 MMHG | DIASTOLIC BLOOD PRESSURE: 67 MMHG

## 2018-08-18 VITALS — SYSTOLIC BLOOD PRESSURE: 127 MMHG | DIASTOLIC BLOOD PRESSURE: 74 MMHG

## 2018-08-19 VITALS — DIASTOLIC BLOOD PRESSURE: 65 MMHG | SYSTOLIC BLOOD PRESSURE: 89 MMHG

## 2018-08-19 VITALS — SYSTOLIC BLOOD PRESSURE: 125 MMHG | DIASTOLIC BLOOD PRESSURE: 87 MMHG

## 2018-08-19 VITALS — DIASTOLIC BLOOD PRESSURE: 64 MMHG | SYSTOLIC BLOOD PRESSURE: 102 MMHG

## 2018-08-19 VITALS — DIASTOLIC BLOOD PRESSURE: 62 MMHG | SYSTOLIC BLOOD PRESSURE: 96 MMHG

## 2018-08-19 VITALS — DIASTOLIC BLOOD PRESSURE: 91 MMHG | SYSTOLIC BLOOD PRESSURE: 122 MMHG

## 2018-08-19 VITALS — DIASTOLIC BLOOD PRESSURE: 49 MMHG | SYSTOLIC BLOOD PRESSURE: 92 MMHG

## 2018-08-19 LAB
ANION GAP SERPL CALC-SCNC: 9 MMOL/L (ref 7–16)
BUN SERPL-MCNC: 53 MG/DL (ref 7–18)
CALCIUM SERPL-MCNC: 8.9 MG/DL (ref 8.5–10.1)
CHLORIDE SERPL-SCNC: 94 MMOL/L (ref 98–107)
CO2 SERPL-SCNC: 28 MMOL/L (ref 21–32)
CREAT SERPL-MCNC: 2.9 MG/DL (ref 0.6–1.3)
GLUCOSE SERPL-MCNC: 112 MG/DL (ref 70–99)
HCT VFR BLD CALC: 36.9 % (ref 42–52)
HGB BLD-MCNC: 11.2 GM/DL (ref 14–18)
MAGNESIUM SERPL-MCNC: 2.6 MG/DL (ref 1.8–2.4)
MCH RBC QN AUTO: 26 PG (ref 26–34)
MCHC RBC AUTO-ENTMCNC: 30.4 G/DL (ref 28–37)
MCV RBC: 85.4 FL (ref 80–100)
MPV: 9.2 FL. (ref 7.2–11.1)
PLATELET COUNT*: 139 THOU/UL (ref 150–400)
POTASSIUM SERPL-SCNC: 4.2 MMOL/L (ref 3.5–5.1)
RBC # BLD AUTO: 4.32 MIL/UL (ref 4.5–6)
RDW-CV: 22.9 % (ref 10.5–14.5)
SODIUM SERPL-SCNC: 131 MMOL/L (ref 136–145)
WBC # BLD AUTO: 6.4 THOU/UL (ref 4–11)

## 2018-08-20 VITALS — DIASTOLIC BLOOD PRESSURE: 75 MMHG | SYSTOLIC BLOOD PRESSURE: 113 MMHG

## 2018-08-20 VITALS — DIASTOLIC BLOOD PRESSURE: 76 MMHG | SYSTOLIC BLOOD PRESSURE: 112 MMHG

## 2018-08-20 VITALS — SYSTOLIC BLOOD PRESSURE: 103 MMHG | DIASTOLIC BLOOD PRESSURE: 69 MMHG

## 2018-08-20 LAB
ANION GAP SERPL CALC-SCNC: 8 MMOL/L (ref 7–16)
BUN SERPL-MCNC: 54 MG/DL (ref 7–18)
CALCIUM SERPL-MCNC: 8.7 MG/DL (ref 8.5–10.1)
CHLORIDE SERPL-SCNC: 95 MMOL/L (ref 98–107)
CO2 SERPL-SCNC: 29 MMOL/L (ref 21–32)
CREAT SERPL-MCNC: 2.7 MG/DL (ref 0.6–1.3)
GLUCOSE SERPL-MCNC: 125 MG/DL (ref 70–99)
HCT VFR BLD CALC: 36.4 % (ref 42–52)
HGB BLD-MCNC: 11.2 GM/DL (ref 14–18)
INR PPP: 1.9
INR PPP: 1.9
MAGNESIUM SERPL-MCNC: 2.7 MG/DL (ref 1.8–2.4)
MCH RBC QN AUTO: 26.1 PG (ref 26–34)
MCHC RBC AUTO-ENTMCNC: 30.7 G/DL (ref 28–37)
MCV RBC: 85 FL (ref 80–100)
MPV: 9.4 FL. (ref 7.2–11.1)
PLATELET COUNT*: 125 THOU/UL (ref 150–400)
POTASSIUM SERPL-SCNC: 4.1 MMOL/L (ref 3.5–5.1)
PROTHROMBIN TIME: 18.1 SECONDS (ref 9.2–11.5)
PROTHROMBIN TIME: 18.5 SECONDS (ref 9.2–11.5)
RBC # BLD AUTO: 4.29 MIL/UL (ref 4.5–6)
RDW-CV: 23 % (ref 10.5–14.5)
SODIUM SERPL-SCNC: 132 MMOL/L (ref 136–145)
WBC # BLD AUTO: 6.5 THOU/UL (ref 4–11)

## 2018-08-22 NOTE — CON
[facility] 
[address] [place]  [address]                    CONSULTATION                  
_______________________________________________________________________________
 
Name:       ISIDRA MCDONNELL               Room:           [address] [place]  
M.R.#:  [id number]      Account #:      [id number]  
Admission:  08/17/18     Attend Phys:    Omari Wesley MD 
Discharge:  08/20/18     Date of Birth:  [birth year]  
         Report #: [id number]
                                                                     [id number]  
_______________________________________________________________________________
THIS REPORT FOR:  //name//                      
 
CC: [facility] physician/PCP
    Omari Wesley
 
DATE OF SERVICE:  08/18/2018
 
 
PRIMARY CARE PHYSICIAN:  Dr. Cueto, [facility].
 
CHIEF COMPLAINT:  CHF.
 
HISTORY OF PRESENT ILLNESS:  The patient is a 48-year-old man, with ischemic
cardiomyopathy and apparently severe LV dysfunction, presented with difficulty
urinating.  During his workup, it was felt he was in a bit of heart failure.  He
admits he does not take his medications.  Apparently, he had left A from a
couple hospitals over the last several months with CHF exacerbations.  Details
of this are not available at this time.
 
From a cardiovascular standpoint, he does have a defibrillator.  He denies any
recent shocks.
 
He denies chest pain or pressure.
 
He denies syncope or presyncope.
 
He denies fevers or chills.
 
His dysuria and hesitancy is a chronic problem.  He has a history of prior
bladder surgery.  Details of this are not available.
 
HOME MEDICATIONS:  Include the following:  Lasix 80 mg p.o. b.i.d., alprazolam
p.r.n., OxyContin, losartan 25 mg daily, Toprol-XL 25 mg, potassium chloride 20
mEq p.o. b.i.d., Aldactone 25 mg daily.
 
PAST MEDICAL HISTORY:  Ischemic cardiomyopathy, hypertension, chronic kidney
disease, urethral strictures.  Baseline creatinine is 2.07 based on lab from [facility].  His BNP is chronically elevated.  There is lab from  where his
BNP is over 3000.
 
SOCIAL HISTORY:  He is a tobacco user.  He does not drink apparently.
 
FAMILY HISTORY:  Positive for heart disease.
 
REVIEW OF SYSTEMS:
CENTRAL NERVOUS SYSTEM:  No seizure or paralysis.
 
 
 
[place] [address]                    CONSULTATION                  
_______________________________________________________________________________
 
Name:       ISIDRA MCDONNELL               Room:           [address]#:  [id number]      Account #:      [id number]  
Admission:  08/17/18     Attend Phys:    Omari Wesley MD 
Discharge:  08/20/18     Date of Birth:  [birth year]  
         Report #: [id number]
                                                                     [id number]  
_______________________________________________________________________________
GENERAL:  No weight loss or fevers.
RESPIRATORY:  Positive cough, positive sputum production, no emphysema
documented.
CARDIOVASCULAR:  No palpitations, no chest discomfort.  Positive dyspnea,
positive orthopnea.  Positive edema.
ENDOCRINE:  No diabetes or thyroid problems.
GASTROINTESTINAL:  No vomiting, vomiting blood or ulcers.
GENITOURINARY:  Positive dysuria and hematuria.
HEMATOLOGIC:  No anemia or bleeding disorders.
ALLERGIES:  No seasonal, medical, aspirin or contrast allergies.
PSYCHIATRIC:  No depression.  Positive anxiety.
MUSCULOSKELETAL:  No arthritis or connective tissue disease.
SKIN:  No rashes.
EYES:  No loss of vision.
EARS, NOSE, THROAT AND MOUTH:  No decreased hearing.  No bleeding from nose or
dentures.
 
PHYSICAL EXAMINATION:
VITAL SIGNS:  Blood pressure is 114/70, pulse is 96, weight is 95 kilos.
GENERAL:  This is a somnolent  male.  He is minimally compliant
with questioning, appears to be somnolent, but has no focal neurologic deficit.
NECK:  No jugular venous distention.
CARDIOVASCULAR:  Regular.  There is positive S3.
LUNGS:  Have diminished breath sounds.
ABDOMEN:  Nontender.
EXTREMITIES:  There is no peripheral edema.
SKIN:  Warm and dry.
PSYCHIATRIC:  The patient has appropriate mood and affect.
 
Electrocardiogram demonstrates a sinus rhythm, diffuse T-wave inversion.
 
LABORATORY DATA:  Hemoglobin is 11.9, white blood count is 6.4, platelet count
is 167,000.  Sodium 131, potassium 4.6, chloride is 93, BUN is 47, creatinine is
2.7.  Troponin I is 0.06 x 3 sets.  NT-proBNP is 11,985.
 
Chest x-ray shows large right effusion with compression atelectasis in the right
middle and lower lobes and small left effusion.
 
IMPRESSION:
1.  Acute systolic congestive heart failure.  I would continue diuretic therapy.
 I suspect he has been noncompliant with his therapy based on historical data.
2.  Large pleural effusion.  I would recommend a thoracentesis.  I do not think
he will diurese well with his kidney function.
3.  Coronary artery disease.  He remains asymptomatic for angina.  We will
continue conservative medical treatment approach.
 
 
 
[place] [address]                    CONSULTATION                  
_______________________________________________________________________________
 
Name:       ISIDRA MCDONNELL               Room:           [address] [place]  
[facility].#:  [id number]      Account #:      [id number]  
Admission:  08/17/18     Attend Phys:    Omari Wesley MD 
Discharge:  08/20/18     Date of Birth:  [birth year]  
         Report #: [id number]
                                                                     [id number]  
_______________________________________________________________________________
4.  Status post implantable cardioverter defibrillator, he has and will continue
to follow with Dr. Cueto.
 
 
 
 
 
 
 
 
 
 
 
 
 
 
 
 
 
 
 
 
 
 
 
 
 
 
 
 
 
 
 
 
 
 
 
 
 
 
 
 
 
 
<ELECTRONICALLY SIGNED>
                                        By:  Brenton Plummer MD, FACC   
08/22/18     1254
D: 08/18/18 1043_______________________________________
T: 08/18/18 2141Brenton Plummer MD, FACC      /nt